# Patient Record
Sex: FEMALE | URBAN - METROPOLITAN AREA
[De-identification: names, ages, dates, MRNs, and addresses within clinical notes are randomized per-mention and may not be internally consistent; named-entity substitution may affect disease eponyms.]

---

## 2022-04-18 ENCOUNTER — LAB REQUISITION (OUTPATIENT)
Dept: LAB | Facility: HOSPITAL | Age: 62
End: 2022-04-18
Payer: COMMERCIAL

## 2022-04-18 LAB
BASOPHILS NFR SNV: 0 %
COLOR FLD: YELLOW
EOSINOPHIL NFR SNV: 0 %
LYMPHOCYTES NFR SNV: 9 %
MONOS+MACROS NFR SNV: 2 %
NEUTROPHILS NFR FLD: 89 %
RBC # FLD AUTO: 1132 /CUMM (ref ?–1)
TOTAL CELLS COUNTED FLD: 100
WBC # FLD: ABNORMAL /CUMM (ref 0–200)

## 2022-04-18 PROCEDURE — 89051 BODY FLUID CELL COUNT: CPT | Performed by: ORTHOPAEDIC SURGERY

## 2022-04-18 PROCEDURE — 87205 SMEAR GRAM STAIN: CPT | Performed by: ORTHOPAEDIC SURGERY

## 2022-04-18 PROCEDURE — 89060 EXAM SYNOVIAL FLUID CRYSTALS: CPT | Performed by: ORTHOPAEDIC SURGERY

## 2022-04-18 PROCEDURE — 87070 CULTURE OTHR SPECIMN AEROBIC: CPT | Performed by: ORTHOPAEDIC SURGERY

## 2023-02-01 ENCOUNTER — LAB ENCOUNTER (OUTPATIENT)
Dept: LAB | Age: 63
End: 2023-02-01
Attending: ORTHOPAEDIC SURGERY
Payer: COMMERCIAL

## 2023-02-01 DIAGNOSIS — Z01.818 PREOP TESTING: ICD-10-CM

## 2023-02-02 LAB — SARS-COV-2 RNA RESP QL NAA+PROBE: NOT DETECTED

## 2023-02-02 RX ORDER — FLUTICASONE PROPIONATE AND SALMETEROL 100; 50 UG/1; UG/1
1 POWDER RESPIRATORY (INHALATION) EVERY 12 HOURS
COMMUNITY
Start: 2022-01-13

## 2023-02-02 RX ORDER — LISINOPRIL AND HYDROCHLOROTHIAZIDE 12.5; 1 MG/1; MG/1
1 TABLET ORAL DAILY
COMMUNITY
Start: 2023-01-16

## 2023-02-02 RX ORDER — LEVOTHYROXINE SODIUM 125 UG/1
125 TABLET ORAL
COMMUNITY
Start: 2022-11-09

## 2023-02-02 RX ORDER — PAROXETINE HYDROCHLORIDE 20 MG/1
TABLET, FILM COATED ORAL
COMMUNITY
Start: 2022-08-05

## 2023-02-03 ENCOUNTER — HOSPITAL ENCOUNTER (OUTPATIENT)
Facility: HOSPITAL | Age: 63
Setting detail: HOSPITAL OUTPATIENT SURGERY
Discharge: HOME | End: 2023-02-03
Attending: ORTHOPAEDIC SURGERY | Admitting: ORTHOPAEDIC SURGERY
Payer: COMMERCIAL

## 2023-02-03 VITALS
SYSTOLIC BLOOD PRESSURE: 144 MMHG | HEART RATE: 77 BPM | HEIGHT: 62 IN | BODY MASS INDEX: 26.5 KG/M2 | RESPIRATION RATE: 16 BRPM | DIASTOLIC BLOOD PRESSURE: 90 MMHG | WEIGHT: 144 LBS | OXYGEN SATURATION: 97 % | TEMPERATURE: 98 F

## 2023-02-03 DIAGNOSIS — Z01.818 PREOP TESTING: Primary | ICD-10-CM

## 2023-02-03 PROBLEM — G56.01 RIGHT CARPAL TUNNEL SYNDROME: Status: ACTIVE | Noted: 2023-02-03

## 2023-02-03 PROCEDURE — 01N50ZZ RELEASE MEDIAN NERVE, OPEN APPROACH: ICD-10-PCS | Performed by: ORTHOPAEDIC SURGERY

## 2023-02-03 RX ORDER — ONDANSETRON 2 MG/ML
4 INJECTION INTRAMUSCULAR; INTRAVENOUS EVERY 6 HOURS PRN
Status: CANCELLED | OUTPATIENT
Start: 2023-02-03

## 2023-02-03 RX ORDER — ACETAMINOPHEN 500 MG
1000 TABLET ORAL EVERY 8 HOURS PRN
Status: CANCELLED | OUTPATIENT
Start: 2023-02-03

## 2023-02-03 RX ORDER — HYDROCODONE BITARTRATE AND ACETAMINOPHEN 5; 325 MG/1; MG/1
1 TABLET ORAL EVERY 8 HOURS PRN
Qty: 15 TABLET | Refills: 0 | Status: SHIPPED | OUTPATIENT
Start: 2023-02-03

## 2023-02-03 RX ORDER — IBUPROFEN 600 MG/1
600 TABLET ORAL EVERY 8 HOURS PRN
Qty: 40 TABLET | Refills: 0 | Status: SHIPPED | OUTPATIENT
Start: 2023-02-03

## 2023-02-03 RX ORDER — CEPHALEXIN 500 MG/1
2000 CAPSULE ORAL ONCE
Status: COMPLETED | OUTPATIENT
Start: 2023-02-03 | End: 2023-02-03

## 2023-02-03 RX ORDER — BUPIVACAINE HYDROCHLORIDE 5 MG/ML
INJECTION, SOLUTION EPIDURAL; INTRACAUDAL AS NEEDED
Status: DISCONTINUED | OUTPATIENT
Start: 2023-02-03 | End: 2023-02-03 | Stop reason: HOSPADM

## 2023-02-03 NOTE — INTERVAL H&P NOTE
Pre-op Diagnosis: Right carpal tunnel syndrome    The above referenced H&P was reviewed by Ngoc Queen MD on 2/3/2023, the patient was examined and no significant changes have occurred in the patient's condition since the H&P was performed. I discussed with the patient and/or legal representative the potential benefits, risks and side effects of this procedure; the likelihood of the patient achieving goals; and potential problems that might occur during recuperation. I discussed reasonable alternatives to the procedure, including risks, benefits and side effects related to the alternatives and risks related to not receiving this procedure. We will proceed with procedure as planned.

## 2023-02-03 NOTE — DISCHARGE INSTRUCTIONS
Keep bandage on for 3 days. You may open and close your fist and fingers, but do not grab, push, or pull with your right hand. To shower, place a plastic bag and tape on your right arm. When showering, keep the right arm up on the shower wall to prevent the water from running past the tape into your hand. On 2/6/23, remove bandage and you may shower. Wash wound gently with soap and water using your other hand. Do not to soak the wound but it is okay to get wet. After your shower, place a dab of antibiotic ointment and clean patch Band-Aid. We will remove the stitches at your next office appointment. Call Dr. Jonathan Brown office as soon as possible to schedule  a 2 week follow up appointment,.

## 2023-02-03 NOTE — OR NURSING
Discharge instructions reviewed with patient. Written instructions given to patient. Patient discharged home.

## 2023-02-03 NOTE — BRIEF OP NOTE
Pre-Operative Diagnosis: Right carpal tunnel syndrome     Post-Operative Diagnosis: Same     Procedure Performed:   Right carpal tunnel release    Surgeon(s) and Role:     * Corine Vogt MD - Primary    Assistant(s): Annamaria Gallardo PA-C    Anesthesia: Local     Surgical Findings: Tourniquet 9 minutes at 250 mmHg. Median nerve without laceration, adhesions, or masses. Drain: None  Specimen: None   Complications: None  Estimated Blood Loss: Less than 1 cc  Stable to day surgery.     Frank Jackson MD  2/3/2023  2:24 PM

## 2023-02-06 NOTE — OPERATIVE REPORT
[facility]    PATIENT'S NAME: [address] [place] [address] PHYSICIAN: Dmitri Newton MD   OPERATING PHYSICIAN: Nicolas Newton MD   PATIENT ACCOUNT#:   [id number]    LOCATION:  [facility]  MEDICAL RECORD #:   [id number]       YOB: 1960  ADMISSION DATE:       02/03/2023      OPERATION DATE:  02/03/2023    OPERATIVE REPORT    PREOPERATIVE DIAGNOSIS:  Right carpal tunnel syndrome. POSTOPERATIVE DIAGNOSIS:  Right carpal tunnel syndrome. PROCEDURE:  Right carpal tunnel release. ASSISTANT:  Ancelmo Pollock PA-C     ANESTHESIA:  Local.    INDICATIONS:  Patient is a 60-year-old woman with the above diagnosis documented by physical exam and ultrasound. She has failed nonoperative treatment. The risks and complications of surgery were discussed, and no guarantees were given. The consent was signed. OPERATIVE TECHNIQUE:  Patient was brought to the operating room and placed in supine position. Appropriate IV access and monitors were placed. The patient took Keflex 2000 mg orally in the preoperative area for antibiotic prophylaxis. No IV access was needed. She was brought to the operating room. She was comfortable on the operative table with nasal cannula oxygen and pulse oximetry. A tourniquet was placed on the upper right arm. Her right upper extremity was provisionally prepped with Betadine, and 10 mL of Sensorcaine 0.5% without epinephrine was injected into the presumed incision area. She tolerated this well. Her right upper extremity was then prepped and draped in a sterile fashion with ChloraPrep. The arm was exsanguinated and the tourniquet inflated to 250 mmHg. Tourniquet time for the case was 9 minutes. Incision was made at the base of the palm in the longitudinal flexor crease. Blunt dissection took place through the palmar fat and fascia.   Self-retaining retractors were placed side to side, and then the assistant held the [address] in the distal end of the wound. Under careful dissection, the distal transverse carpal ligament was incised, exposing the deep palmar fat. A grooved sheath retractor was placed under the ligament and advanced proximally. The ligament was incised further. The assistant now held the [address] in the proximal end of the wound. The successive paddles of the [address] set were then used to dissect the transverse carpal ligament under direct visualization. The [employer] was then used to transect the rest of the transverse carpal ligament from distal to proximal.  The nerve was inspected, and no lacerations, masses, or adhesions were noted. The wound was then irrigated and closed with 3-0 nylon suture in horizontal interrupted mattress sutures. Xeroform, sterile gauze, sterile Webril, and Ace wrap were used for dressings. Patient held pressure for 15 minutes by the clock and was instructed to do so on and off for the next hour or so for hemostasis. Postoperative instructions were given in both written and oral form, and she was given Norco 5 mg and ibuprofen 600 mg for postop pain relief. She will follow up with Dr. Abdullahi Mathias in 1 to 2 weeks time in the office. Blood loss was less than 1 mL. No specimens were sent. No drains were used. No complications were noted. Patient tolerated procedure well. Dictated By Shelby Lee.  Abdullahi Mathias MD  d: 02/03/2023 14:32:04  t: 02/03/2023 15:23:23  Job [id number]  XTZ/    cc: Dr. Leatha Marquez

## 2023-02-13 ENCOUNTER — LAB REQUISITION (OUTPATIENT)
Dept: LAB | Facility: HOSPITAL | Age: 63
End: 2023-02-13
Payer: COMMERCIAL

## 2023-02-13 DIAGNOSIS — M10.9 GOUT, UNSPECIFIED: ICD-10-CM

## 2023-02-13 PROCEDURE — 89051 BODY FLUID CELL COUNT: CPT | Performed by: ORTHOPAEDIC SURGERY

## 2023-02-13 PROCEDURE — 87205 SMEAR GRAM STAIN: CPT | Performed by: ORTHOPAEDIC SURGERY

## 2023-02-13 PROCEDURE — 87070 CULTURE OTHR SPECIMN AEROBIC: CPT | Performed by: ORTHOPAEDIC SURGERY

## 2023-02-14 LAB
BASOPHILS NFR SNV: 0 %
COLOR FLD: YELLOW
EOSINOPHIL NFR SNV: 0 %
LYMPHOCYTES NFR SNV: 9 %
MONOS+MACROS NFR SNV: 3 %
NEUTROPHILS NFR FLD: 88 %
RBC # FLD AUTO: 1900 /CUMM (ref ?–1)
TOTAL CELLS COUNTED FLD: 100
TOTAL CELLS COUNTED SNV: 9596 /CUMM (ref 0–200)
WBC # SNV: 9596 /CUMM

## (undated) DIAGNOSIS — M17.11 UNILATERAL PRIMARY OSTEOARTHRITIS, RIGHT KNEE: ICD-10-CM

## (undated) DEVICE — OCCLUSIVE GAUZE STRIP,3% BISMUTH TRIBROMOPHENATE IN PETROLATUM BLEND: Brand: XEROFORM

## (undated) DEVICE — STRETCH BANDAGE: Brand: CURITY

## (undated) DEVICE — OSTEOTOME INTM SGL SRG CTRS

## (undated) DEVICE — GAMMEX® NON-LATEX PI ORTHO SIZE 7, STERILE POLYISOPRENE POWDER-FREE SURGICAL GLOVE: Brand: GAMMEX

## (undated) DEVICE — UPPER EXTREMITY: Brand: MEDLINE INDUSTRIES, INC.

## (undated) DEVICE — SUCTION CANISTER, 3000CC,SAFELINER: Brand: DEROYAL

## (undated) DEVICE — BANDAGE COMPR W4INXL12FT E

## (undated) DEVICE — GAMMEX® NON-LATEX PI ORTHO SIZE 8, STERILE POLYISOPRENE POWDER-FREE SURGICAL GLOVE: Brand: GAMMEX

## (undated) DEVICE — GAMMEX® PI HYBRID SIZE 7.5, STERILE POWDER-FREE SURGICAL GLOVE, POLYISOPRENE AND NEOPRENE BLEND: Brand: GAMMEX

## (undated) DEVICE — GAMMEX® PI HYBRID SIZE 6.5, STERILE POWDER-FREE SURGICAL GLOVE, POLYISOPRENE AND NEOPRENE BLEND: Brand: GAMMEX

## (undated) DEVICE — DRESSING CRTY CNFRM 3IN

## (undated) DEVICE — SKIN PREP TRAY 4 COMPARTM TRAY: Brand: MEDLINE INDUSTRIES, INC.

## (undated) DEVICE — APPLICATOR CHLORAPREP 26ML

## (undated) DEVICE — SPLINT PLASTER 4

## (undated) DEVICE — SOL NACL IRRIG 0.9% 1000ML BTL